# Patient Record
Sex: MALE | Race: WHITE
[De-identification: names, ages, dates, MRNs, and addresses within clinical notes are randomized per-mention and may not be internally consistent; named-entity substitution may affect disease eponyms.]

---

## 2020-01-30 ENCOUNTER — HOSPITAL ENCOUNTER (EMERGENCY)
Dept: HOSPITAL 74 - JER | Age: 40
Discharge: HOME | End: 2020-01-30
Payer: COMMERCIAL

## 2020-01-30 VITALS — BODY MASS INDEX: 23.1 KG/M2

## 2020-01-30 VITALS — TEMPERATURE: 97.9 F | SYSTOLIC BLOOD PRESSURE: 110 MMHG | DIASTOLIC BLOOD PRESSURE: 68 MMHG | HEART RATE: 100 BPM

## 2020-01-30 DIAGNOSIS — N20.0: ICD-10-CM

## 2020-01-30 DIAGNOSIS — K57.32: Primary | ICD-10-CM

## 2020-01-30 LAB
ALBUMIN SERPL-MCNC: 3.6 G/DL (ref 3.4–5)
ALP SERPL-CCNC: 76 U/L (ref 45–117)
ALT SERPL-CCNC: 25 U/L (ref 13–61)
ANION GAP SERPL CALC-SCNC: 4 MMOL/L (ref 8–16)
APPEARANCE UR: CLEAR
AST SERPL-CCNC: 12 U/L (ref 15–37)
BASOPHILS # BLD: 0.2 % (ref 0–2)
BILIRUB SERPL-MCNC: 0.4 MG/DL (ref 0.2–1)
BILIRUB UR STRIP.AUTO-MCNC: NEGATIVE MG/DL
BUN SERPL-MCNC: 9.7 MG/DL (ref 7–18)
CALCIUM SERPL-MCNC: 9.2 MG/DL (ref 8.5–10.1)
CHLORIDE SERPL-SCNC: 105 MMOL/L (ref 98–107)
CO2 SERPL-SCNC: 31 MMOL/L (ref 21–32)
COLOR UR: YELLOW
CREAT SERPL-MCNC: 1.1 MG/DL (ref 0.55–1.3)
DEPRECATED RDW RBC AUTO: 13.4 % (ref 11.9–15.9)
EOSINOPHIL # BLD: 0.6 % (ref 0–4.5)
GLUCOSE SERPL-MCNC: 112 MG/DL (ref 74–106)
HCT VFR BLD CALC: 46.7 % (ref 35.4–49)
HGB BLD-MCNC: 15.2 GM/DL (ref 11.7–16.9)
KETONES UR QL STRIP: (no result)
LEUKOCYTE ESTERASE UR QL STRIP.AUTO: NEGATIVE
LYMPHOCYTES # BLD: 7.9 % (ref 8–40)
MCH RBC QN AUTO: 27.7 PG (ref 25.7–33.7)
MCHC RBC AUTO-ENTMCNC: 32.5 G/DL (ref 32–35.9)
MCV RBC: 85.1 FL (ref 80–96)
MONOCYTES # BLD AUTO: 5.8 % (ref 3.8–10.2)
NEUTROPHILS # BLD: 85.5 % (ref 42.8–82.8)
NITRITE UR QL STRIP: NEGATIVE
PH UR: 5 [PH] (ref 5–8)
PLATELET # BLD AUTO: 226 K/MM3 (ref 134–434)
PMV BLD: 9.7 FL (ref 7.5–11.1)
POTASSIUM SERPLBLD-SCNC: 4.3 MMOL/L (ref 3.5–5.1)
PROT SERPL-MCNC: 7.2 G/DL (ref 6.4–8.2)
PROT UR QL STRIP: NEGATIVE
PROT UR QL STRIP: NEGATIVE
RBC # BLD AUTO: 5.49 M/MM3 (ref 4–5.6)
SODIUM SERPL-SCNC: 140 MMOL/L (ref 136–145)
SP GR UR: 1.02 (ref 1.01–1.03)
UROBILINOGEN UR STRIP-MCNC: 0.2 MG/DL (ref 0.2–1)
WBC # BLD AUTO: 12.9 K/MM3 (ref 4–10)

## 2020-01-30 PROCEDURE — 3E0333Z INTRODUCTION OF ANTI-INFLAMMATORY INTO PERIPHERAL VEIN, PERCUTANEOUS APPROACH: ICD-10-PCS | Performed by: EMERGENCY MEDICINE

## 2020-01-30 NOTE — PDOC
Rapid Medical Evaluation


Chief Complaint: Pain, Acute


Time Seen by Provider: 01/30/20 12:31


Medical Evaluation: 


 Allergies











Allergy/AdvReac Type Severity Reaction Status Date / Time


 


No Known Allergies Allergy   Verified 01/30/20 12:31











01/30/20 12:34





CC: Lower abd pain x 3 days





Pt is a 38 y/o male who presents to the ED with 3 days of lower abdominal pain 

and constipation.  Saw his PMD and was told to take Miralax but did not take 

it.  Last BM was today but it was small and hard stool.  Has urge to have a BM.

  No abd surgery in the past. No urinary symptoms.





On brief exam: Non toxic appearing, diffuse lower abdominal tenderness to 

palpation.  No significant CVA tenderness. 





Orders: IV, labs, abd xray





Proceed to ED for further evaluation

## 2020-01-30 NOTE — PDOC
*Physical Exam





- Vital Signs


 Last Vital Signs











Temp Pulse Resp BP Pulse Ox


 


 98.2 F   113 H  20   139/86   98 


 


 01/30/20 12:34  01/30/20 12:34  01/30/20 12:34  01/30/20 12:34  01/30/20 12:34














- Physical Exam





01/30/20 16:41


awake alert lungs clear bilat heart rrr no mrg abd soft llq ttp. no rebound no 

guarding. ext wwp. no edema. no cva tenderness. alert oriented x 3. 





ED Treatment Course





- LABORATORY


CBC & Chemistry Diagram: 


 01/30/20 13:19





 01/30/20 13:19





- ADDITIONAL ORDERS


Additional order review: 


 Laboratory  Results











  01/30/20 01/30/20 01/30/20





  14:52 13:19 13:19


 


Sodium    140


 


Potassium    4.3


 


Chloride    105


 


Carbon Dioxide    31


 


Anion Gap    4 L


 


BUN    9.7


 


Creatinine    1.1


 


Est GFR (CKD-EPI)AfAm    96.81


 


Est GFR (CKD-EPI)NonAf    83.53


 


Random Glucose    112 H


 


Calcium    9.2


 


Total Bilirubin    0.4


 


AST    12 L


 


ALT    25


 


Alkaline Phosphatase    76


 


Total Protein    7.2


 


Albumin    3.6


 


Lipase   103 


 


Urine Color  Yellow  


 


Urine Appearance  Clear  


 


Urine pH  5.0  


 


Ur Specific Gravity  1.024  


 


Urine Protein  Negative  


 


Urine Glucose (UA)  Negative  


 


Urine Ketones  Trace H  


 


Urine Blood  Negative  


 


Urine Nitrite  Negative  


 


Urine Bilirubin  Negative  


 


Urine Urobilinogen  0.2  


 


Ur Leukocyte Esterase  Negative  








 











  01/30/20





  13:19


 


RBC  5.49


 


MCV  85.1


 


MCHC  32.5


 


RDW  13.4


 


MPV  9.7


 


Neutrophils %  85.5 H


 


Lymphocytes %  7.9 L


 


Monocytes %  5.8


 


Eosinophils %  0.6


 


Basophils %  0.2














- Medications


Given in the ED: 


ED Medications














Discontinued Medications














Generic Name Dose Route Start Last Admin





  Trade Name Freq  PRN Reason Stop Dose Admin


 


Sodium Chloride  1,000 mls @ 1,000 mls/hr  01/30/20 14:09  01/30/20 14:58





  Normal Saline -  IV  01/30/20 15:08  1,000 mls/hr





  ASDIR STA   Administration





     





     





     





     


 


Ketorolac Tromethamine  30 mg  01/30/20 14:09  01/30/20 14:58





  Toradol Injection -  IVPUSH  01/30/20 14:10  30 mg





  ONCE ONE   Administration





     





     





     





     














Medical Decision Making





- Medical Decision Making





01/30/20 16:42


40 yo male with c/o llq pain diarrahea and intermittent constipation. nausea no 

vomiting. chills no fever. pain llq radiating to back constant worse with 

walking and movement. no urinary complaints/ did have a colonoscopy few months 

ago shows diverticulosis. 





on my exam llq ttp. no rebound no guarding.


differnetial colitis, constipation, diverticulitis, plan ct a/p labs 


labs noted for mildly elevated wbc 12, ct a/p pending.


pt seen and examined in conjunction with Sharlene Hensley agree with assamritnt 

and plan.





Discharge





- Discharge Information


Problems reviewed: Yes


Clinical Impression/Diagnosis: 


 Diverticulitis








- Follow up/Referral


Referrals: 


ON STAFF,NOT [Primary Care Provider] - 





- Patient Discharge Instructions





- Post Discharge Activity

## 2020-01-30 NOTE — PDOC
History of Present Illness





- General


Chief Complaint: Pain, Acute


Stated Complaint: LF SIDE LWR ABD PAIN


Time Seen by Provider: 01/30/20 12:31


History Source: Patient


Exam Limitations: No Limitations





- History of Present Illness


Travel History: No


Initial Comments: 





01/30/20 14:47


39-year-old male presents the ED with left lower quadrant pain rating to his 

back associated with diarrhea for the past 2 days.  Patient states chills and 

decreased appetite but denies recent constipation, fever, urinary complaints, 

or GI history.  Patient also denies recent travel or recent illness


01/30/20 16:00





Timing/Duration: reports: getting worse


Quality: reports: mild, moderate, cramping


Abdominal Pain Onset Location: reports: LLQ


Pain Radiation: reports: flank, back


Activities at Onset: reports: none


Aggravating Factors: improves with: None


Alleviating Factors: improves with: None





Past History





- Travel


Traveled outside of the country in the last 30 days: No


Close contact w/someone who was outside of country & ill: No





- Past Medical History


Allergies/Adverse Reactions: 


 Allergies











Allergy/AdvReac Type Severity Reaction Status Date / Time


 


No Known Allergies Allergy   Verified 01/30/20 12:31











Home Medications: 


Ambulatory Orders





Ciprofloxacin [Cipro (Restricted To Id)] 500 mg PO Q12H #20 tablet 01/30/20 


Linaclotide [Linzess] 145 mcg PO ONCE 01/30/20 


metroNIDAZOLE [Metronidazole] 500 mg PO TID #30 tablet 01/30/20 








COPD: No





- Psycho Social/Smoking Cessation Hx


Smoking History: Never smoked


Patient Lives Alone: No


Lives with/in: spouse/SO





**Review of Systems





- Review of Systems


Able to Perform ROS?: Yes


Constitutional: Yes: Chills


HEENTM: No: Symptoms Reported


Respiratory: No: Symptoms reported


Cardiac (ROS): No: Symptoms Reported


ABD/GI: Yes: Diarrhea, Abdominal cramping


: No: Symptoms Reported


Musculoskeletal: No: Symptoms Reported


Integumentary: No: Symptoms Reported


Neurological: No: Symptoms reported





*Physical Exam





- Vital Signs


 Last Vital Signs











Temp Pulse Resp BP Pulse Ox


 


 98.2 F   113 H  20   139/86   98 


 


 01/30/20 12:34  01/30/20 12:34  01/30/20 12:34  01/30/20 12:34  01/30/20 12:34














- Physical Exam


General Appearance: Yes: Nourished, Appropriately Dressed.  No: Apparent 

Distress


HEENT: negative: Pale Conjunctivae


Neck: positive: Supple


Respiratory/Chest: positive: Lungs Clear, Normal Breath Sounds.  negative: 

Respiratory Distress, Accessory Muscle Use


Cardiovascular: positive: Regular Rhythm, Tachycardia.  negative: Murmur


Gastrointestinal/Abdominal: positive: Soft, Tenderness (Left lower quadrant 

left flank).  negative: Distended, Guarding, Rebound


Musculoskeletal: positive: CVA Tenderness (L)


Extremity: positive: Normal Inspection


Integumentary: positive: Warm, Moist


Neurologic: positive: Motor Strength 5/5 (Ambulatory)





ED Treatment Course





- LABORATORY


CBC & Chemistry Diagram: 


 01/30/20 13:19





 01/30/20 13:19





- ADDITIONAL ORDERS


Additional order review: 


 Laboratory  Results











  01/30/20 01/30/20





  13:19 13:19


 


Sodium   140


 


Potassium   4.3


 


Chloride   105


 


Carbon Dioxide   31


 


Anion Gap   4 L


 


BUN   9.7


 


Creatinine   1.1


 


Est GFR (CKD-EPI)AfAm   96.81


 


Est GFR (CKD-EPI)NonAf   83.53


 


Random Glucose   112 H


 


Calcium   9.2


 


Total Bilirubin   0.4


 


AST   12 L


 


ALT   25


 


Alkaline Phosphatase   76


 


Total Protein   7.2


 


Albumin   3.6


 


Lipase  103 








 











  01/30/20





  13:19


 


RBC  5.49


 


MCV  85.1


 


MCHC  32.5


 


RDW  13.4


 


MPV  9.7


 


Neutrophils %  85.5 H


 


Lymphocytes %  7.9 L


 


Monocytes %  5.8


 


Eosinophils %  0.6


 


Basophils %  0.2














- RADIOLOGY


Radiology Studies Ordered: 














 Category Date Time Status


 


 ABDOMEN & PELVIS CT W/O CONTR [CT] Stat CT Scan  01/30/20 14:09 Ordered














Medical Decision Making





- Medical Decision Making





01/30/20 14:22


Chief complaint: Left abdominal pain associated with nausea


Exam: Patient with left lower quadrant left flank and left CVA tenderness.  

Tachycardic.


Plan: Labs, urine, abdominal CT IV fluids and Toradol


01/30/20 15:36


Abdominal x-ray shows no fecal retention or constipation.  There is some free 

air in the colon.  There is no free air appreciated.  Patient currently in [place] 

but states was asymptomatic as far as pain prior to transportation


01/30/20 15:37


 Laboratory Tests











  01/30/20 01/30/20 01/30/20





  13:19 13:19 13:19


 


WBC  12.9 H  


 


Hgb  15.2  


 


Hct  46.7  


 


Absolute Neuts (auto)  11.0 H  


 


Neutrophils %  85.5 H  


 


Lymphocytes %  7.9 L  


 


Sodium   140 


 


Potassium   4.3 


 


Chloride   105 


 


Carbon Dioxide   31 


 


Anion Gap   4 L 


 


BUN   9.7 


 


Creatinine   1.1 


 


Est GFR (CKD-EPI)AfAm   96.81 


 


Est GFR (CKD-EPI)NonAf   83.53 


 


Random Glucose   112 H 


 


Calcium   9.2 


 


Total Bilirubin   0.4 


 


AST   12 L 


 


ALT   25 


 


Alkaline Phosphatase   76 


 


Total Protein   7.2 


 


Albumin   3.6 


 


Lipase    103








Urine  results pending


01/30/20 16:01


 Laboratory Tests











  01/30/20





  14:52


 


Urine Ketones  Trace H


 


Urine Blood  Negative


 


Urine Nitrite  Negative


 


Urine Bilirubin  Negative


 


Ur Leukocyte Esterase  Negative








ct results pending





Discharge





- Discharge Information


Problems reviewed: Yes


Clinical Impression/Diagnosis: 


 Diverticulitis, Renal calculus





Condition: Stable


Disposition: HOME





- Additional Discharge Information


Prescriptions: 


Ciprofloxacin [Cipro (Restricted To Id)] 500 mg PO Q12H #20 tablet


metroNIDAZOLE [Metronidazole] 500 mg PO TID #30 tablet





- Follow up/Referral


Referrals: 


ON STAFF,NOT [Primary Care Provider] - 


Jeff Hoskins MD [Staff Physician] - 


Jimmie Weston MD [Staff Physician] - 





- Patient Discharge Instructions


Additional Instructions: 


Drink plenty of fluids.


Take Tylenol or Motrin as needed for pain.  Follow 's instructions 

for appropriate dosage.


Take metronidazole 500 mg 3 times a day for the next 10 days.


Take ciprofloxacin 500 mg twice a day for the next 10 days.


You been given a referral for gastroenterology and urology.  Call to schedule 

an appointment.


You should start to feel better 3 to 5 days.  Even if you feel better finish 

all the medications.


Avoid foods with seeds-strawberries, raspberries, poppy seeds, flaxseeds and 

whole grains.


It is important that you get daily fiber.  Leafy green vegetables will help 

with your fiber intake.


Return to the emergency department for any new or worsening symptoms.


Thank you very much for choosing us to provide your emergent healthcare needs.





- Post Discharge Activity

## 2020-01-30 NOTE — PDOC
*Physical Exam





- Vital Signs


 Last Vital Signs











Temp Pulse Resp BP Pulse Ox


 


 98.2 F   113 H  20   139/86   98 


 


 01/30/20 12:34  01/30/20 12:34  01/30/20 12:34  01/30/20 12:34  01/30/20 12:34














- Physical Exam


General Appearance: Yes: Appropriately Dressed.  No: Apparent Distress


HEENT: positive: Normal ENT Inspection


Respiratory/Chest: positive: Lungs Clear, Normal Breath Sounds.  negative: 

Respiratory Distress, Accessory Muscle Use


Cardiovascular: positive: Regular Rhythm, Regular Rate


Gastrointestinal/Abdominal: positive: Normal Bowel Sounds, Tender (Left lower 

quadrant tenderness.  Guarding present.), Soft


Musculoskeletal: positive: Normal Inspection, CVA Tenderness (L)


Extremity: positive: Normal Inspection





ED Treatment Course





- LABORATORY


CBC & Chemistry Diagram: 


 01/30/20 13:19





 01/30/20 13:19





- ADDITIONAL ORDERS


Additional order review: 


 Laboratory  Results











  01/30/20 01/30/20 01/30/20





  14:52 13:19 13:19


 


Sodium    140


 


Potassium    4.3


 


Chloride    105


 


Carbon Dioxide    31


 


Anion Gap    4 L


 


BUN    9.7


 


Creatinine    1.1


 


Est GFR (CKD-EPI)AfAm    96.81


 


Est GFR (CKD-EPI)NonAf    83.53


 


Random Glucose    112 H


 


Calcium    9.2


 


Total Bilirubin    0.4


 


AST    12 L


 


ALT    25


 


Alkaline Phosphatase    76


 


Total Protein    7.2


 


Albumin    3.6


 


Lipase   103 


 


Urine Color  Yellow  


 


Urine Appearance  Clear  


 


Urine pH  5.0  


 


Ur Specific Gravity  1.024  


 


Urine Protein  Negative  


 


Urine Glucose (UA)  Negative  


 


Urine Ketones  Trace H  


 


Urine Blood  Negative  


 


Urine Nitrite  Negative  


 


Urine Bilirubin  Negative  


 


Urine Urobilinogen  0.2  


 


Ur Leukocyte Esterase  Negative  








 











  01/30/20





  13:19


 


RBC  5.49


 


MCV  85.1


 


MCHC  32.5


 


RDW  13.4


 


MPV  9.7


 


Neutrophils %  85.5 H


 


Lymphocytes %  7.9 L


 


Monocytes %  5.8


 


Eosinophils %  0.6


 


Basophils %  0.2














- Medications


Given in the ED: 


ED Medications














Discontinued Medications














Generic Name Dose Route Start Last Admin





  Trade Name Freq  PRN Reason Stop Dose Admin


 


Sodium Chloride  1,000 mls @ 1,000 mls/hr  01/30/20 14:09  01/30/20 14:58





  Normal Saline -  IV  01/30/20 15:08  1,000 mls/hr





  ASDIR STA   Administration





     





     





     





     


 


Ketorolac Tromethamine  30 mg  01/30/20 14:09  01/30/20 14:58





  Toradol Injection -  IVPUSH  01/30/20 14:10  30 mg





  ONCE ONE   Administration





     





     





     





     














ED Progress Note





- Progress Note


Progress Note: 





01/30/20 16:26


Received signout from nurse practitioner Shellie.





Briefly this is a 39-year-old male presents emergency department with left 

lower abdominal pain radiating to the left flank.


Abdominal x-ray shows no fecal retention or constipation.  Colonic air present.

  No free abdominal air.  Laboratory testing reveals elevated WBC at 12.9.





Chemistries are unremarkable.


Lipase is unremarkable.


Urinalysis notable for trace ketones.  Otherwise unremarkable.





CT of the abdomen and pelvis is pending





Medical Decision Making





- Medical Decision Making





01/30/20 17:40


CT scan is read by Dr. Concepcion:


Acute diverticulitis seen involving the junction of the descending and the 

sigmoid colon as well as the upper third of the sigmoid colon.  No definite 

evidence of abscess or pneumoperitoneum.





2 mm nonobstructing left renal calculus.


Nonspecific gallbladder contraction as noted above.


This patient's white count is 12,000


I feel it is safe to discharge home with p.o. antibiotics.  





 discharge home with prescription for Flagyl 500 mg 3 times daily for 10 days 

and ciprofloxacin 500 mg twice daily for 10 days.





Referrals for GI and urology for follow-up.





I discussed the physical exam findings, ancillary test results and final 

diagnoses with the patient. I answered all of the patient's questions. The 

patient was satisfied with the care received and felt comfortable with the 

discharge plan and treatment plan. The patient will call their primary care 

physician within 24 hours to arrange follow-up and will return to the Emergency 

Department with any new, persistent or worsening symptoms. 





Discharge





- Discharge Information


Problems reviewed: Yes


Clinical Impression/Diagnosis: 


 Diverticulitis, Renal calculus





Condition: Stable


Disposition: HOME





- Admission


No





- Additional Discharge Information


Prescriptions: 


Ciprofloxacin [Cipro (Restricted To Id)] 500 mg PO Q12H #20 tablet


metroNIDAZOLE [Metronidazole] 500 mg PO TID #30 tablet





- Follow up/Referral


Referrals: 


ON STAFF,NOT [Primary Care Provider] - 


Jimmie Weston MD [Staff Physician] - 


Jeff Hoskins MD [Staff Physician] - 





- Patient Discharge Instructions


Additional Instructions: 


Drink plenty of fluids.


Take Tylenol or Motrin as needed for pain.  Follow 's instructions 

for appropriate dosage.


Take metronidazole 500 mg 3 times a day for the next 10 days.


Take ciprofloxacin 500 mg twice a day for the next 10 days.


You been given a referral for gastroenterology and urology.  Call to schedule 

an appointment.


You should start to feel better 3 to 5 days.  Even if you feel better finish 

all the medications.


Avoid foods with seeds-strawberries, raspberries, poppy seeds, flaxseeds and 

whole grains.


It is important that you get daily fiber.  Leafy green vegetables will help 

with your fiber intake.


Return to the emergency department for any new or worsening symptoms.


Thank you very much for choosing us to provide your emergent healthcare needs.





- Post Discharge Activity